# Patient Record
Sex: MALE | Race: WHITE | URBAN - METROPOLITAN AREA
[De-identification: names, ages, dates, MRNs, and addresses within clinical notes are randomized per-mention and may not be internally consistent; named-entity substitution may affect disease eponyms.]

---

## 2024-03-20 ENCOUNTER — OFFICE VISIT (OUTPATIENT)
Dept: FAMILY MEDICINE CLINIC | Facility: CLINIC | Age: 54
End: 2024-03-20
Payer: COMMERCIAL

## 2024-03-20 VITALS
WEIGHT: 219 LBS | HEART RATE: 62 BPM | TEMPERATURE: 97 F | DIASTOLIC BLOOD PRESSURE: 64 MMHG | SYSTOLIC BLOOD PRESSURE: 100 MMHG | HEIGHT: 74 IN | OXYGEN SATURATION: 98 % | BODY MASS INDEX: 28.11 KG/M2

## 2024-03-20 DIAGNOSIS — R10.31 RIGHT INGUINAL PAIN: Primary | ICD-10-CM

## 2024-03-20 DIAGNOSIS — Z12.11 SCREENING FOR COLON CANCER: ICD-10-CM

## 2024-03-20 PROBLEM — F41.9 ANXIETY: Status: ACTIVE | Noted: 2024-03-20

## 2024-03-20 PROCEDURE — 99203 OFFICE O/P NEW LOW 30 MIN: CPT | Performed by: NURSE PRACTITIONER

## 2024-03-20 RX ORDER — ESCITALOPRAM OXALATE 10 MG/1
10 TABLET ORAL DAILY
COMMUNITY

## 2024-03-20 NOTE — PROGRESS NOTES
Sam Mckinney is a 53 year old male who presents as a new patient to establish.     HPI:   Pt complains of approx one month history of abd pain, started to edgar-umbilical area then migrated to right groin area where it is still occurring intermittently, last on 3/18. Reports presents as dull ache and lasts for several hours, sometimes radiating into scrotal area as well. Is not associated with any activity or positioning. Denies fever/chills, nausea, emesis, diarrhea, bloody/dark tarry stools, BRBPR, poor appetite. Stated eating and drinking normally. Denies pain is worse with eating or having a bowel movement. Denies dysuria, hematuria, urgency, frequency. Has not treated with anything.     Has prior history of anxiety, managed with escitalopram 10mg daily that he started in December 2023 from psychiatrist. Feels this is working well, mood is stable. Denies suicidal ideations.    Works in technology consulting. Is  with 2 children. Feels he eats a healthy diet. Does exercise routinely. Reports is non-smoker. Drinks 5 alcoholic drinks per week.     Wt Readings from Last 6 Encounters:   03/20/24 219 lb (99.3 kg)   10/07/16 215 lb (97.5 kg)       No results found for: \"CHOLEST\", \"HDL\", \"LDL\", \"TRIGLY\", \"AST\", \"ALT\"   Current Outpatient Medications   Medication Sig Dispense Refill    escitalopram 10 MG Oral Tab Take 1 tablet (10 mg total) by mouth daily.        History reviewed. No pertinent past medical history.   History reviewed. No pertinent surgical history.   History reviewed. No pertinent family history.   Social History     Socioeconomic History    Marital status:    Tobacco Use    Smoking status: Never    Smokeless tobacco: Never   Substance and Sexual Activity    Alcohol use: Yes     Comment: socially    Drug use: No   Other Topics Concern    Caffeine Concern Yes     Comment: 2 cups of coffee    Exercise Yes    Seat Belt Yes      Social History: as above     Health  Maintenance  Immunizations: Recommend flu vaccine for upcoming flu season.      Immunization History   Administered Date(s) Administered    Covid-19 Vaccine Pfizer 30 mcg/0.3 ml 04/12/2021, 05/05/2021    Covid-19 Vaccine Pfizer Lonny-Sucrose 30 mcg/0.3 ml 06/14/2022    FLUZONE 6 months and older PFS 0.5 ml (37447) 11/13/2018    Flucelvax 0.5ml Vaccine 12/05/2017    Influenza 01/21/2013     Dental Visits:  Regularly  Colonoscopy:  overdue, ordered as below.     REVIEW OF SYSTEMS:   GENERAL: feels well otherwise  SKIN: denies any unusual skin lesions  HENT: denies nasal congestion, sinus pain/pressure or ear discomfort  LUNGS: denies shortness of breath with exertion  CARDIOVASCULAR: denies chest pain on exertion, palpitations  GI: as above  : as above  NEURO: denies headaches, dizziness, lightheadedness    EXAM:   /64   Pulse 62   Temp 97 °F (36.1 °C)   Ht 6' 2\" (1.88 m)   Wt 219 lb (99.3 kg)   SpO2 98%   BMI 28.12 kg/m²   Body mass index is 28.12 kg/m².   GENERAL: well developed, well nourished,in no apparent distress  SKIN: no rashes,no suspicious lesions  HEENT: atraumatic and normocephalic, EOMI  NECK: supple, normal ROM  LUNGS: clear to auscultation bilaterally. Effort non-labored.  CARDIO: RRR without murmur.   GI: soft, non-distended, no TTP w/o masses, organomegaly or hernias. Right inguinal area with mild TTP but no appreciable hernia during valsalva.   : Bilat descended testes are non-tender, w/o masses/nodules. No penile lesions. No hernias felt.  EXTREMITIES: no edema, clubbing or cyanosis. ROM to extremities WNL.  NEURO: Oriented times three, motor/sensory function intact. Follows commands appropriately.    ASSESSMENT AND PLAN:     Encounter Diagnoses   Name Primary?    Right inguinal pain- history consistent with hernia but exam negative. Consider muscle strain? Referred to Dr. Lassiter for further eval. Verbalized understanding of instructions and agreeable to this plan of care.    Yes     Screening for colon cancer- referred to Dr. Lassiter for testing.         No orders of the defined types were placed in this encounter.      Meds & Refills for this Visit:  Requested Prescriptions      No prescriptions requested or ordered in this encounter       Imaging & Consults:  SURGERY - INTERNAL    No follow-ups on file.  There are no Patient Instructions on file for this visit.

## 2024-03-28 ENCOUNTER — OFFICE VISIT (OUTPATIENT)
Facility: LOCATION | Age: 54
End: 2024-03-28
Payer: COMMERCIAL

## 2024-03-28 VITALS
HEART RATE: 50 BPM | BODY MASS INDEX: 28.11 KG/M2 | TEMPERATURE: 97 F | HEIGHT: 74 IN | OXYGEN SATURATION: 98 % | RESPIRATION RATE: 18 BRPM | WEIGHT: 219 LBS

## 2024-03-28 DIAGNOSIS — R10.31 GROIN DISCOMFORT, RIGHT: Primary | ICD-10-CM

## 2024-03-28 PROCEDURE — 99204 OFFICE O/P NEW MOD 45 MIN: CPT | Performed by: STUDENT IN AN ORGANIZED HEALTH CARE EDUCATION/TRAINING PROGRAM

## 2024-03-28 NOTE — H&P
New Patient Visit Note       Active Problems      1. Groin discomfort, right        Chief Complaint   Chief Complaint   Patient presents with    New Patient     NP - Possible right inguinal hernia, pain, pulling when moving around,   Colonoscopy consult, no family hx of colon cancer, no symptoms for colon cancer,   no other symptoms. Had a colonoscopy 10 years ago       History of Present Illness   53 year old male who is here for evaluation of right-sided abdominal and groin pain.  Patient reports pain discomfort started approximately 2 months ago.  He started a new core exercise at the time and started soon after to have pain that started at the periumbilical area.  The pain then migrated to the right lower quadrant and specifically to the right groin.  The pain is mild in severity but causes continuous discomfort.  The groin pain also radiates to the right scrotum.  He denies any symptoms in the left groin or the left testicle.  He also describes a different colicky type of pain in the right lower quadrant and radiates to the right flank.  Has had a colonoscopy 10 years ago and has not had any endoscopic evaluation since.  He denies any melena, bright red blood per rectum, unintentional weight loss nausea vomiting constipation or diarrhea.  He has been taking some fiber supplements lately and have noticed some bloating in general.  He denies any family history of colon cancer.      Allergies  Sam is allergic to seasonal.    Past Medical / Surgical / Social / Family History    The past medical and past surgical history have been reviewed by me today.    Past Medical History:   Diagnosis Date    Allergic rhinitis      Past Surgical History:   Procedure Laterality Date    COLONOSCOPY  a while ago       The family history and social history have been reviewed by me today.    History reviewed. No pertinent family history.  Social History     Socioeconomic History    Marital status:    Tobacco Use    Smoking  status: Never    Smokeless tobacco: Never   Substance and Sexual Activity    Alcohol use: Yes     Comment: socially    Drug use: No   Other Topics Concern    Caffeine Concern No    Stress Concern No    Weight Concern No    Special Diet No    Exercise No    Seat Belt No        Current Outpatient Medications:     escitalopram 10 MG Oral Tab, Take 1 tablet (10 mg total) by mouth daily., Disp: , Rfl:       Review of Systems  The Review of Systems has been reviewed by me during today.  Review of Systems   Constitutional:  Negative for chills, diaphoresis, fatigue and fever.   HENT:  Negative for ear discharge, ear pain and sore throat.    Eyes:  Negative for pain and discharge.   Respiratory:  Negative for cough, chest tightness and shortness of breath.    Cardiovascular:  Negative for chest pain, palpitations and leg swelling.   Gastrointestinal:  Positive for abdominal pain. Negative for abdominal distention, blood in stool, constipation, diarrhea, nausea and vomiting.   Genitourinary:  Negative for dysuria, frequency, hematuria and urgency.   Skin:  Negative for color change, pallor and rash.   Neurological:  Negative for weakness, light-headedness, numbness and headaches.   Hematological:  Negative for adenopathy. Does not bruise/bleed easily.   Psychiatric/Behavioral:  Negative for agitation and confusion.        Physical Findings   Pulse 50   Temp 97 °F (36.1 °C) (Temporal)   Resp 18   Ht 74\"   Wt 219 lb (99.3 kg)   SpO2 98%   BMI 28.12 kg/m²   Physical Exam  Constitutional:       Appearance: Normal appearance.   HENT:      Head: Normocephalic and atraumatic.   Cardiovascular:      Pulses: Normal pulses.   Pulmonary:      Effort: Pulmonary effort is normal.   Abdominal:      Comments: Soft nontender nondistended, right groin with questionable small inguinal hernia, tender to the touch, no rebound tenderness or guarding throughout the abdomen   Skin:     General: Skin is warm.      Capillary Refill: Capillary  refill takes less than 2 seconds.   Neurological:      Mental Status: He is alert and oriented to person, place, and time. Mental status is at baseline.             Assessment/Plan  1. Groin discomfort, right        Sam Mckinney is a 53 year old male here for evaluation of right groin pain and right lower quadrant pain.  There is a questionable small inguinal hernia on the right side.  He also has symptoms of right lower quadrant pain that is colicky in nature.  Obtain a CT scan of the abdomen pelvis to better characterize the anatomy of the right groin and see if there is a true hernia.  I will also evaluate the right colon and the right lower quadrant structures to see if there are any pathology causing his pain.  I also referred him to Dr. Ibarra for evaluation for colonoscopy to as he meets screening H criteria and has symptoms of right lower quadrant pain.     No orders of the defined types were placed in this encounter.      Imaging & Referrals   CT ABDOMEN+PELVIS(CONTRAST ONLY)(CPT=74177)    Follow Up  No follow-ups on file.    Klaudia Lassiter MD

## 2024-04-09 ENCOUNTER — LAB ENCOUNTER (OUTPATIENT)
Dept: LAB | Facility: HOSPITAL | Age: 54
End: 2024-04-09
Attending: INTERNAL MEDICINE
Payer: COMMERCIAL

## 2024-04-09 DIAGNOSIS — R10.84 GENERALIZED ABDOMINAL PAIN: ICD-10-CM

## 2024-04-09 DIAGNOSIS — R10.31 RIGHT LOWER QUADRANT PAIN: ICD-10-CM

## 2024-04-09 DIAGNOSIS — R10.13 EPIGASTRIC PAIN: ICD-10-CM

## 2024-04-09 DIAGNOSIS — R14.0 BLOATING: ICD-10-CM

## 2024-04-09 LAB
ALBUMIN SERPL-MCNC: 3.8 G/DL (ref 3.4–5)
ALBUMIN/GLOB SERPL: 1.4 {RATIO} (ref 1–2)
ALP LIVER SERPL-CCNC: 51 U/L
ALT SERPL-CCNC: 27 U/L
ANION GAP SERPL CALC-SCNC: 9 MMOL/L (ref 0–18)
AST SERPL-CCNC: 23 U/L (ref 15–37)
BASOPHILS # BLD AUTO: 0.04 X10(3) UL (ref 0–0.2)
BASOPHILS NFR BLD AUTO: 0.6 %
BILIRUB SERPL-MCNC: 0.6 MG/DL (ref 0.1–2)
BUN BLD-MCNC: 14 MG/DL (ref 9–23)
CALCIUM BLD-MCNC: 8.7 MG/DL (ref 8.5–10.1)
CHLORIDE SERPL-SCNC: 112 MMOL/L (ref 98–112)
CO2 SERPL-SCNC: 22 MMOL/L (ref 21–32)
CREAT BLD-MCNC: 1.07 MG/DL
EGFRCR SERPLBLD CKD-EPI 2021: 84 ML/MIN/1.73M2 (ref 60–?)
EOSINOPHIL # BLD AUTO: 0.21 X10(3) UL (ref 0–0.7)
EOSINOPHIL NFR BLD AUTO: 3 %
ERYTHROCYTE [DISTWIDTH] IN BLOOD BY AUTOMATED COUNT: 11.8 %
FASTING STATUS PATIENT QL REPORTED: NO
GLOBULIN PLAS-MCNC: 2.7 G/DL (ref 2.8–4.4)
GLUCOSE BLD-MCNC: 99 MG/DL (ref 70–99)
HCT VFR BLD AUTO: 45.8 %
HGB BLD-MCNC: 15.9 G/DL
IMM GRANULOCYTES # BLD AUTO: 0.01 X10(3) UL (ref 0–1)
IMM GRANULOCYTES NFR BLD: 0.1 %
LYMPHOCYTES # BLD AUTO: 2.73 X10(3) UL (ref 1–4)
LYMPHOCYTES NFR BLD AUTO: 39.3 %
MCH RBC QN AUTO: 32.4 PG (ref 26–34)
MCHC RBC AUTO-ENTMCNC: 34.7 G/DL (ref 31–37)
MCV RBC AUTO: 93.3 FL
MONOCYTES # BLD AUTO: 0.45 X10(3) UL (ref 0.1–1)
MONOCYTES NFR BLD AUTO: 6.5 %
NEUTROPHILS # BLD AUTO: 3.5 X10 (3) UL (ref 1.5–7.7)
NEUTROPHILS # BLD AUTO: 3.5 X10(3) UL (ref 1.5–7.7)
NEUTROPHILS NFR BLD AUTO: 50.5 %
OSMOLALITY SERPL CALC.SUM OF ELEC: 297 MOSM/KG (ref 275–295)
PLATELET # BLD AUTO: 231 10(3)UL (ref 150–450)
POTASSIUM SERPL-SCNC: 4 MMOL/L (ref 3.5–5.1)
PROT SERPL-MCNC: 6.5 G/DL (ref 6.4–8.2)
RBC # BLD AUTO: 4.91 X10(6)UL
SODIUM SERPL-SCNC: 143 MMOL/L (ref 136–145)
T4 FREE SERPL-MCNC: 1 NG/DL (ref 0.8–1.7)
TSI SER-ACNC: 2.16 MIU/ML (ref 0.36–3.74)
WBC # BLD AUTO: 6.9 X10(3) UL (ref 4–11)

## 2024-04-09 PROCEDURE — 84443 ASSAY THYROID STIM HORMONE: CPT

## 2024-04-09 PROCEDURE — 36415 COLL VENOUS BLD VENIPUNCTURE: CPT

## 2024-04-09 PROCEDURE — 84439 ASSAY OF FREE THYROXINE: CPT

## 2024-04-09 PROCEDURE — 80053 COMPREHEN METABOLIC PANEL: CPT

## 2024-04-09 PROCEDURE — 85025 COMPLETE CBC W/AUTO DIFF WBC: CPT

## 2024-04-26 ENCOUNTER — PATIENT MESSAGE (OUTPATIENT)
Dept: ADMINISTRATIVE | Age: 54
End: 2024-04-26

## 2024-04-26 NOTE — TELEPHONE ENCOUNTER
[facility]  online to initiate authorization    CT ABDOMEN+PELVIS(CONTRAST ONLY)(CPT=74177)        Referral #: [id number]    Scheduled For: 04/29/2024    Status: pending authorization > To discuss this case with the reviewer, contact [facility] at [phone]  .    Use Reference Case Number: [id number]      Clinical notes sent for review.     Patient notified of pending status via [employer].     Appt Desk > Noted

## 2024-05-06 ENCOUNTER — HOSPITAL ENCOUNTER (OUTPATIENT)
Dept: CT IMAGING | Facility: HOSPITAL | Age: 54
Discharge: HOME | End: 2024-05-06
Attending: STUDENT IN AN ORGANIZED HEALTH CARE EDUCATION/TRAINING PROGRAM
Payer: COMMERCIAL

## 2024-05-06 DIAGNOSIS — R10.31 GROIN DISCOMFORT, RIGHT: ICD-10-CM

## 2024-05-06 PROCEDURE — 74177 CT ABD & PELVIS W/CONTRAST: CPT | Performed by: STUDENT IN AN ORGANIZED HEALTH CARE EDUCATION/TRAINING PROGRAM

## 2024-05-07 ENCOUNTER — TELEPHONE (OUTPATIENT)
Dept: ORTHOPEDICS CLINIC | Facility: CLINIC | Age: 54
End: 2024-05-07

## 2024-05-07 DIAGNOSIS — Z01.89 ENCOUNTER FOR LOWER EXTREMITY COMPARISON IMAGING STUDY: ICD-10-CM

## 2024-05-07 DIAGNOSIS — M25.562 LEFT KNEE PAIN, UNSPECIFIED CHRONICITY: Primary | ICD-10-CM

## 2024-05-07 NOTE — TELEPHONE ENCOUNTER
Patient has an appointment scheduled with Yoni Delaney on 5/10/24 for left knee injury. Please advise if imaging is needed prior.

## 2024-05-10 ENCOUNTER — OFFICE VISIT (OUTPATIENT)
Dept: ORTHOPEDICS CLINIC | Facility: CLINIC | Age: 54
End: 2024-05-10
Payer: COMMERCIAL

## 2024-05-10 ENCOUNTER — HOSPITAL ENCOUNTER (OUTPATIENT)
Dept: GENERAL RADIOLOGY | Age: 54
Discharge: HOME | End: 2024-05-10
Attending: PHYSICIAN ASSISTANT
Payer: COMMERCIAL

## 2024-05-10 VITALS — WEIGHT: 219 LBS | BODY MASS INDEX: 28.11 KG/M2 | HEIGHT: 74 IN

## 2024-05-10 DIAGNOSIS — M25.562 LEFT KNEE PAIN, UNSPECIFIED CHRONICITY: ICD-10-CM

## 2024-05-10 DIAGNOSIS — Z01.89 ENCOUNTER FOR LOWER EXTREMITY COMPARISON IMAGING STUDY: ICD-10-CM

## 2024-05-10 DIAGNOSIS — M25.362 KNEE INSTABILITY, LEFT: Primary | ICD-10-CM

## 2024-05-10 PROCEDURE — 73564 X-RAY EXAM KNEE 4 OR MORE: CPT | Performed by: PHYSICIAN ASSISTANT

## 2024-05-10 PROCEDURE — 99204 OFFICE O/P NEW MOD 45 MIN: CPT | Performed by: PHYSICIAN ASSISTANT

## 2024-05-10 PROCEDURE — 73562 X-RAY EXAM OF KNEE 3: CPT | Performed by: PHYSICIAN ASSISTANT

## 2024-05-10 NOTE — H&P
[facility] - ORTHOPEDICS  [address] [place] [id number]  [phone]     NEW PATIENT VISIT - HISTORY AND PHYSICAL EXAMINATION     Name: Sam Mckinney   MRN: [id number]  Date: 5/10/2024     CC: Left knee pain.     REFERRED BY: None Pcp    HPI:   Sam Mckinney is a very pleasant 53 year old male who presents today for evaluation, consultation, and management of left knee injury after [employer] exercising in 2023.  He has since had persistent swelling, stiffness and instability.  He rates his pain to be a 7 out of 10 and notes less than 50% normal function.  He is otherwise very active and enjoys exercising.  He states that he had a left ACL injury in high school, and then again in college.  This never underwent extensive treatment or workup.  He complains of limited mobility, locking and limited range of motion. He works in business.      Referred from wife, and a friend at [facility].       PMH:   Past Medical History:    Allergic rhinitis    Bloating    see above    Chronic cough    cough lasted months.  i was given a precription of prednisone in March to get rid of cough.    Flatulence/gas pain/belching    started around 4 days after finishing a 5 day prednisone prescription.    Stress    i have a stressful job.  i was given a prescription of generic lexapro that i started in December.    Wears glasses    i have had flasses si ce i was 17.  i had lasik on my 30s.       PAST SURGICAL HX:  Past Surgical History:   Procedure Laterality Date    Colonoscopy  a while ago       FAMILY HX:  Family History   Problem Relation Age of Onset    Heart Attack Mother          of heart attack    Stroke Paternal Grandfather        ALLERGIES:  Seasonal    MEDICATIONS:   Current Outpatient Medications   Medication Sig Dispense Refill    PEG 3350-KCl-NaBcb-NaCl-NaSulf (PEG-3350/ELECTROLYTES) 236 g Oral Recon Soln Use as directed by physician. 4000 mL 0       ROS: A  comprehensive 14 point review of systems was performed and was negative aside from the aforementioned per history of present illness.    SOCIAL HX:  Social History     Occupational History    Not on file   Tobacco Use    Smoking status: Never    Smokeless tobacco: Never   Substance and Sexual Activity    Alcohol use: Yes     Alcohol/week: 4.0 standard drinks of alcohol     Types: 4 Standard drinks or equivalent per week     Comment: i have a couple of drinks 2 -3 days a week on average    Drug use: No    Sexual activity: Not on file       PE:   Vitals:    05/10/24 0755   Weight: 219 lb (99.3 kg)   Height: 6' 2\" (1.88 m)     Estimated body mass index is 28.12 kg/m² as calculated from the following:    Height as of this encounter: 6' 2\" (1.88 m).    Weight as of this encounter: 219 lb (99.3 kg).    Physical Exam  Constitutional:       Appearance: Normal appearance.   HENT:      Head: Normocephalic and atraumatic.   Eyes:      Extraocular Movements: Extraocular movements intact.   Neck:      Musculoskeletal: Normal range of motion and neck supple.   Cardiovascular:      Pulses: Normal pulses.   Pulmonary:      Effort: Pulmonary effort is normal. No respiratory distress.   Abdominal:      General: There is no distension.   Skin:     General: Skin is warm.      Capillary Refill: Capillary refill takes less than 2 seconds.      Findings: No bruising.   Neurological:      General: No focal deficit present.      Mental Status: Alert.   Psychiatric:         Mood and Affect: Mood normal.     Examination of the left knee demonstrates:     Skin is intact, warm and dry.   Atrophy: none    Effusion: moderate    Joint line tenderness: none  Crepitation: none   April:  positive    Patellar mobility: normal without apprehension  J-sign: none    ROM: Extension full  Flexion 120 degrees  ACL:  2B   PCL:  Negative Posterior Drawer  Collateral Ligaments: Stable to Varus and Valgus stress at 0 and 30 degrees  Strength: mild weakness    Hip joint: normal pain-free ROM   Gait:  mildly antalgic   Leg length: equal and symmetric  Alignment:  neutral     No obvious peripheral edema noted.   Distal neurovascular exam demonstrates normal perfusion, intact sensation to light touch and full strength.     Examination of the contralateral knee demonstrates:  No significant atrophy, swelling or effusion. Full range of motion. Neurovascularly intact distally.    Radiographic Examination/Diagnostics:  I personally viewed, independently interpreted and radiology report was reviewed.      CT ABDOMEN+PELVIS(CONTRAST ONLY)(CPT=74177)    Result Date: 5/6/2024  PROCEDURE:  CT ABDOMEN+PELVIS (CONTRAST ONLY) (CPT=74177)  COMPARISON:  None.  INDICATIONS:  R10.31 Groin discomfort, right  TECHNIQUE:  CT scanning was performed from the dome of the diaphragm to the pubic symphysis with non-ionic intravenous contrast material. Post contrast coronal MPR imaging was performed.  Dose reduction techniques were used. Dose information is transmitted to the ACR (American College of Radiology) NRDR (National Radiology Data Registry) which includes the Dose Index Registry.  PATIENT STATED HISTORY:(As transcribed by Technologist)  Patient c/o right groin discomfort, and abdominal pain.   CONTRAST USED:  100cc of Isovue 370  FINDINGS:  LIVER:  There are 2 small hypoattenuating lesions within the left lobe of the liver measuring 4 mm and 3 mm.  Both of the lesions are too small to definitively characterize.  In the absence of any known malignancy or risk factors for hepatoma these are considered benign.  BILIARY:  No visible dilatation or calcification.  PANCREAS:  No lesion, fluid collection, ductal dilatation, or atrophy.  SPLEEN:  No enlargement or focal lesion.  There is a small accessory splenule. KIDNEYS:  No mass, obstruction, or calcification.  ADRENALS:  No mass or enlargement.  AORTA/VASCULAR:  No aneurysm or dissection.  RETROPERITONEUM:  No mass or adenopathy.   BOWEL/MESENTERY:  No visible mass, obstruction, or bowel wall thickening.  There is a normal-appearing appendix.  ABDOMINAL WALL:  There is a tiny fat containing umbilical hernia without bowel herniation.  There is a fat containing left inguinal hernia without bowel herniation.  No definitive right inguinal hernia identified. URINARY BLADDER:  The bladder is not distended limiting evaluation for potential wall thickening. PELVIC NODES:  No adenopathy.  PELVIC ORGANS:  No visible mass.  Pelvic organs appropriate for patient age.  BONES:  Degenerative disc disease noted at L5-S1 with disc space narrowing endplate spurring.  Small central disc protrusion noted at L4-5. LUNG BASES:  Mild dependent atelectasis is seen in both lower lobes. OTHER:  Negative.             CONCLUSION:  1. There is a small fat containing left inguinal hernia without bowel herniation.  There is a tiny fat containing umbilical hernia.  There is no definitive right inguinal hernia identified. 2. Degenerative disc disease at L5-S1 noted with disc space narrowing endplate spurring.  There is a small central disc protrusion expected at L4-5.  Correlate clinically.  The need for follow-up MR imaging should be based clinically. 3. Other incidental findings noted as detailed above.    LOCATION:  [facility]   Dictated by (CST): Salvador Livingston MD on 5/06/2024 at 6:32 PM     Finalized by (CST): Salvador Livingston MD on 5/06/2024 at 6:46 PM         IMPRESSION: Sam Mckinney is a 53 year old male who presents with left knee injury concerning for ACL tear.     PLAN:   We had a detailed discussion outlining the etiology, anatomy, pathophysiology, and natural history of the patient's findings. Imaging was reviewed in detail and correlated to a 3-dimensional model of the patient's pathology.     We reviewed the treatment of this disease condition.  In light of the acute traumatic incident, loss of normal function, and  failure to progress  conservatively we recommend an MRI to evaluate the integrity of the patient's left knee ACL. The patient will follow up after imaging.   Differential diagnosis includes but not limited to: cartilage injury/loose body, meniscus tear/injury, ACL tear, bone marrow edema, and osteoarthritis.     External records were also reviewed for pertinent historical findings contributing to the patients undiagnosed new problem with uncertain prognosis.     The patient had the opportunity to ask questions, and all questions were answered appropriately.     FOLLOW-UP:  Return to clinic following completion of MRI to review scan and findings.             Yoni Delaney [facility], PA-C Orthopedic Surgery / Sports Medicine Specialist  [facility] Surgery  [address] [place] [address] [id number] [place], [email or web address]  [email or web address]  t: [phone]  o: [phone]  f: [phone]    This note was dictated using Dragon software.  While it was briefly proofread prior to completion, some grammatical, spelling, and word choice errors due to dictation may still occur.

## 2024-05-12 ENCOUNTER — HOSPITAL ENCOUNTER (OUTPATIENT)
Dept: MRI IMAGING | Facility: HOSPITAL | Age: 54
End: 2024-05-12
Attending: PHYSICIAN ASSISTANT

## 2024-05-12 ENCOUNTER — HOSPITAL ENCOUNTER (OUTPATIENT)
Dept: MRI IMAGING | Facility: HOSPITAL | Age: 54
Discharge: HOME | End: 2024-05-12
Attending: PHYSICIAN ASSISTANT

## 2024-05-12 DIAGNOSIS — M25.362 KNEE INSTABILITY, LEFT: ICD-10-CM

## 2024-05-12 PROCEDURE — 73721 MRI JNT OF LWR EXTRE W/O DYE: CPT | Performed by: PHYSICIAN ASSISTANT

## 2024-05-15 PROBLEM — R19.7 DIARRHEA: Status: ACTIVE | Noted: 2024-05-15

## 2024-05-15 PROBLEM — R10.13 ABDOMINAL PAIN, EPIGASTRIC: Status: ACTIVE | Noted: 2024-05-15

## 2024-05-15 PROBLEM — D12.0 BENIGN NEOPLASM OF CECUM: Status: ACTIVE | Noted: 2024-05-15

## 2024-05-15 PROBLEM — K29.30 CHRONIC SUPERFICIAL GASTRITIS WITHOUT BLEEDING: Status: ACTIVE | Noted: 2024-05-15

## 2024-05-15 PROBLEM — Z12.11 SPECIAL SCREENING FOR MALIGNANT NEOPLASMS, COLON: Status: ACTIVE | Noted: 2024-05-15

## 2024-05-15 PROBLEM — R14.1 ABDOMINAL GAS PAIN: Status: ACTIVE | Noted: 2024-05-15

## 2024-05-17 ENCOUNTER — OFFICE VISIT (OUTPATIENT)
Dept: ORTHOPEDICS CLINIC | Facility: CLINIC | Age: 54
End: 2024-05-17

## 2024-05-17 DIAGNOSIS — M94.20 CHONDROMALACIA: ICD-10-CM

## 2024-05-17 DIAGNOSIS — M25.362 KNEE INSTABILITY, LEFT: Primary | ICD-10-CM

## 2024-05-17 PROCEDURE — 99213 OFFICE O/P EST LOW 20 MIN: CPT | Performed by: PHYSICIAN ASSISTANT

## 2024-05-17 NOTE — PROGRESS NOTES
[facility] - ORTHOPEDICS  [address] [place] [address]  [phone]       Name: Sam Mckinney   MRN: [id number]  Date: 5/17/2024     REASON FOR VISIT: Follow up for left knee pain.     INTERVAL HISTORY:  Sam Mckinney is a 53 year old male who returns for evaluation of left knee pain.     To summarize,  left knee injury after [employer] exercising in January 2023.  He has since had persistent swelling, stiffness and instability.  He rates his pain to be a 7 out of 10 and notes less than 50% normal function.  He is otherwise very active and enjoys exercising.  He states that he had a left ACL injury in high school, and then again in college.  This never underwent extensive treatment or workup.  He complains of limited mobility, locking and limited range of motion. He works in business.       Referred from wife, and a friend at [facility].     ROS: ROS    PE:   There were no vitals filed for this visit.  Estimated body mass index is 28.12 kg/m² as calculated from the following:    Height as of 5/10/24: 6' 2\" (1.88 m).    Weight as of 5/10/24: 219 lb (99.3 kg).    Physical Exam  Constitutional:       Appearance: Normal appearance.   HENT:      Head: Normocephalic and atraumatic.   Eyes:      Extraocular Movements: Extraocular movements intact.   Neck:      Musculoskeletal: Normal range of motion and neck supple.   Cardiovascular:      Pulses: Normal pulses.   Pulmonary:      Effort: Pulmonary effort is normal. No respiratory distress.   Abdominal:      General: There is no distension.   Skin:     General: Skin is warm.      Capillary Refill: Capillary refill takes less than 2 seconds.      Findings: No bruising.   Neurological:      General: No focal deficit present.      Mental Status: She is alert.   Psychiatric:         Mood and Affect: Mood normal.     Examination of the left knee demonstrates:      Skin is intact, warm and dry.   Atrophy: none    Effusion:  moderate    Joint line tenderness: none  Crepitation: none   April:  positive    Patellar mobility: normal without apprehension  J-sign: none    ROM: Extension full  Flexion 120 degrees  ACL:  2B   PCL:  Negative Posterior Drawer  Collateral Ligaments: Stable to Varus and Valgus stress at 0 and 30 degrees  Strength: mild weakness   Hip joint: normal pain-free ROM   Gait:  mildly antalgic   Leg length: equal and symmetric  Alignment:  neutral      No obvious peripheral edema noted.   Distal neurovascular exam demonstrates normal perfusion, intact sensation to light touch and full strength.      Examination of the contralateral knee demonstrates:  No significant atrophy, swelling or effusion. Full range of motion. Neurovascularly intact distally.    Radiographic Examination/Diagnostics:    I personally viewed, independently interpreted and radiology report was reviewed.    MRI KNEE, LEFT (GJY=28229)    Result Date: 5/13/2024  PROCEDURE:  MRI KNEE, LEFT (CPT=73721)  COMPARISON:  None.  INDICATIONS:  Patient presents with left knee pain and instability.  TECHNIQUE:  Axial, coronal, and sagittal proton density with and without fat saturation images were obtained.  PATIENT STATED HISTORY: (As transcribed by Technologist)  Pt has left knee pain.    FINDINGS:  LIGAMENTS:          The ACL, PCL, patellar retinacula, and collateral ligament complexes are intact.  There is thickening of the proximal, superficial aspect of the MCL consistent with low-grade chronic sprain. MENISCI:            The medial and lateral menisci are intact without evidence of tear or significant degenerative change. TENDONS:            There is mild distal quadriceps tendinosis and mild patellar tendinosis.  No high-grade tendinopathy about the knee noted. MUSCULATURE:        No evidence of strain, edema, or atrophy. BONY COMPARTMENTS:  There is mild osteoarthritis involving the patellofemoral and medial femoral tibial compartments with mild,  low-grade articular cartilage loss involving the medial patellar facet, mid trochlear cartilage and mid weight-bearing surface  of the medial femoral condyle.  Mild chondromalacia of the lateral femoral tibial compartment without raysa osteoarthritis. SYNOVIUM:           There is a moderate knee joint effusion.  No intra-articular bodies are suggested.             CONCLUSION:  1. Mild osteoarthritis of the patellofemoral and medial femoral tibial compartments. 2. Mild chondromalacia of the lateral femoral tibial compartment. 3. Moderate joint effusion.   LOCATION:  [facility]          Dictated by (CST): Juancarlos Solo DO on 5/13/2024 at 8:27 AM     Finalized by (CST): Juancarlos Solo DO on 5/13/2024 at 8:43 AM       XR KNEE (3 VIEWS), RIGHT (CPT=73562)    Result Date: 5/10/2024  PROCEDURE:  XR KNEE ROUTINE (3 VIEWS), RIGHT (CPT=73562)  TECHNIQUE:  Three views were obtained including patellar view.  COMPARISON:  None.  INDICATIONS:  Left knee pain  PATIENT STATED HISTORY: (As transcribed by Technologist)  Ortho consult. Patient states left knee pain for 2 months, right knee imaged for comparison.     FINDINGS:  No acute fracture or dislocation.  There is tricompartmental osteoarthritis with joint space narrowing and osteophyte formation, greatest in the medial compartment.  There is chondrocalcinosis.            CONCLUSION:  No acute osseous findings.  Moderate osteoarthritis.   LOCATION:  [facility]   Dictated by (CST): Kwame Guan MD on 5/10/2024 at 8:25 AM     Finalized by (CST): Kwame Guan MD on 5/10/2024 at 8:26 AM       XR KNEE, COMPLETE (4 OR MORE VIEWS), LEFT (CPT=73564)    Result Date: 5/10/2024  PROCEDURE:  XR KNEE, COMPLETE (4 OR MORE VIEWS), LEFT (CPT=73564)  TECHNIQUE:  AP, lateral, sunrise, and tunnel views were obtained  COMPARISON:  None.  INDICATIONS:  M25.562 Left knee pain, unspecified chronicity  PATIENT STATED HISTORY: (As transcribed by Technologist)  Ortho consult. Patient states left knee pain for  2 months, right knee imaged for comparison.    FINDINGS:  No acute fracture or dislocation.  There is tricompartmental osteoarthritis with joint space narrowing and osteophyte formation, greatest in the medial compartment, overall moderate in degree.  There is chondrocalcinosis.  No significant joint effusion.            CONCLUSION:  No acute osseous findings.  Moderate tricompartmental osteoarthritis.  Chondrocalcinosis.   LOCATION:  [facility]   Dictated by (CST): Kwame Guan MD on 5/10/2024 at 8:18 AM     Finalized by (CST): Kwame Guan MD on 5/10/2024 at 8:19 AM       CT ABDOMEN+PELVIS(CONTRAST ONLY)(CPT=74177)    Result Date: 5/6/2024  PROCEDURE:  CT ABDOMEN+PELVIS (CONTRAST ONLY) (CPT=74177)  COMPARISON:  None.  INDICATIONS:  R10.31 Groin discomfort, right  TECHNIQUE:  CT scanning was performed from the dome of the diaphragm to the pubic symphysis with non-ionic intravenous contrast material. Post contrast coronal MPR imaging was performed.  Dose reduction techniques were used. Dose information is transmitted to the ACR (American College of Radiology) NRDR (National Radiology Data Registry) which includes the Dose Index Registry.  PATIENT STATED HISTORY:(As transcribed by Technologist)  Patient c/o right groin discomfort, and abdominal pain.   CONTRAST USED:  100cc of Isovue 370  FINDINGS:  LIVER:  There are 2 small hypoattenuating lesions within the left lobe of the liver measuring 4 mm and 3 mm.  Both of the lesions are too small to definitively characterize.  In the absence of any known malignancy or risk factors for hepatoma these are considered benign.  BILIARY:  No visible dilatation or calcification.  PANCREAS:  No lesion, fluid collection, ductal dilatation, or atrophy.  SPLEEN:  No enlargement or focal lesion.  There is a small accessory splenule. KIDNEYS:  No mass, obstruction, or calcification.  ADRENALS:  No mass or enlargement.  AORTA/VASCULAR:  No aneurysm or dissection.  RETROPERITONEUM:  No  mass or adenopathy.  BOWEL/MESENTERY:  No visible mass, obstruction, or bowel wall thickening.  There is a normal-appearing appendix.  ABDOMINAL WALL:  There is a tiny fat containing umbilical hernia without bowel herniation.  There is a fat containing left inguinal hernia without bowel herniation.  No definitive right inguinal hernia identified. URINARY BLADDER:  The bladder is not distended limiting evaluation for potential wall thickening. PELVIC NODES:  No adenopathy.  PELVIC ORGANS:  No visible mass.  Pelvic organs appropriate for patient age.  BONES:  Degenerative disc disease noted at L5-S1 with disc space narrowing endplate spurring.  Small central disc protrusion noted at L4-5. LUNG BASES:  Mild dependent atelectasis is seen in both lower lobes. OTHER:  Negative.             CONCLUSION:  1. There is a small fat containing left inguinal hernia without bowel herniation.  There is a tiny fat containing umbilical hernia.  There is no definitive right inguinal hernia identified. 2. Degenerative disc disease at L5-S1 noted with disc space narrowing endplate spurring.  There is a small central disc protrusion expected at L4-5.  Correlate clinically.  The need for follow-up MR imaging should be based clinically. 3. Other incidental findings noted as detailed above.    LOCATION:  [facility]   Dictated by (CST): Salvador Livingston MD on 5/06/2024 at 6:32 PM     Finalized by (CST): Salvador Livingston MD on 5/06/2024 at 6:46 PM         IMPRESSION: Sam Mckinney is a 53 year old male who presented for follow up of left knee MCL sprain, and chondromalacia.     PLAN:   We had a detailed discussion outlining the etiology, anatomy, pathophysiology, and natural history of the patient's findings.    We reviewed the treatment of this disease condition.  Fortunately, treatment is amenable to conservative treatment which we chose to optimize at today's visit.  We recommended physical therapy to aid in strengthening, range  of motion, functional improvement, and return to baseline activity.      We reviewed the treatment of this disease condition.     We provided education, and discussed at great length the use of OrthoBiologics, specifically, Platelet Rich Plasma (PRP). We discussed the growing evidence for the efficacy of PRP injections with regard to the patient's specific findings, as well as the promotion of healing for muscle, tendon, and joint injuries.     We discussed the scientific rationale for this procedure which is that the plasma contains platelets which release growth factors that induce a healing response wherever they are applied. We also discussed the benefits, risks, and limitations. The patient understands that there is a slightly higher level of complexity to this procedure compared to other injections such as cortisone, or viscosupplementation.     We also discussed the benefits of PRP in comparison to surgery, specifically including, but not limited to: less invasive than an open surgical procedure for the same condition, possible shorter recovery time, significantly more cost effective.     The patient had opportunity to ask questions and all questions were answered appropriately.    I spent 20 minutes in preparation to see the patient, counseling/education of relevant pathology, discussing imaging results, ordering therapy  intervention, care coordination, and documentation into the electronic medical record.  The patient had opportunity to ask questions and all questions were answered appropriately.    FOLLOW-UP:  As needed, left knee PRP.             Sincer ENIO Delaney, [facility], PA-C Orthopedic Surgery / Sports Medicine Specialist  [facility]  [address] [place] [id number]   [email or web address]  [email or web address]  t: [phone]  o: [phone]  f: [phone]    This note was dictated using Dragon software.  While it was briefly proofread prior to completion, some grammatical,  spelling, and word choice errors due to dictation may still occur.

## 2024-08-07 ENCOUNTER — NURSE ONLY (OUTPATIENT)
Dept: LAB | Facility: HOSPITAL | Age: 54
End: 2024-08-07
Attending: INTERNAL MEDICINE
Payer: COMMERCIAL

## 2024-08-07 DIAGNOSIS — Z86.19 HISTORY OF HELICOBACTER PYLORI INFECTION: ICD-10-CM

## 2024-08-07 PROCEDURE — 83013 H PYLORI (C-13) BREATH: CPT

## 2024-08-08 LAB — H PYLORI BREATH TEST: NEGATIVE

## 2024-09-13 ENCOUNTER — HOSPITAL ENCOUNTER (OUTPATIENT)
Dept: MRI IMAGING | Age: 54
Discharge: HOME | End: 2024-09-13
Attending: INTERNAL MEDICINE
Payer: COMMERCIAL

## 2024-09-13 DIAGNOSIS — K76.9 LIVER LESION: ICD-10-CM

## 2024-09-13 PROCEDURE — A9581 GADOXETATE DISODIUM INJ: HCPCS | Performed by: INTERNAL MEDICINE

## 2024-09-13 PROCEDURE — 74183 MRI ABD W/O CNTR FLWD CNTR: CPT | Performed by: INTERNAL MEDICINE

## 2024-12-11 ENCOUNTER — TELEPHONE (OUTPATIENT)
Dept: ORTHOPEDICS CLINIC | Facility: CLINIC | Age: 54
End: 2024-12-11

## 2024-12-11 DIAGNOSIS — M54.50 LOW BACK PAIN, UNSPECIFIED BACK PAIN LATERALITY, UNSPECIFIED CHRONICITY, UNSPECIFIED WHETHER SCIATICA PRESENT: Primary | ICD-10-CM

## 2024-12-11 NOTE — TELEPHONE ENCOUNTER
Xray ordered, scheduled and patient is aware to come in early.      Future Appointments   Date Time Provider Department Center   12/19/2024  1:20 PM Ankur King,  [facility]

## 2024-12-11 NOTE — TELEPHONE ENCOUNTER
NP, referred by his physical therapist for chronic lower back pain.    Advised patient to arrive 20 min early for poss imaging.    Please advise, thanks.

## 2024-12-31 ENCOUNTER — TELEPHONE (OUTPATIENT)
Dept: ORTHOPEDICS CLINIC | Facility: CLINIC | Age: 54
End: 2024-12-31

## 2024-12-31 NOTE — TELEPHONE ENCOUNTER
Patient was asked to reschedule appointment due to arriving after the river period. After patient left the facility I called patient and left a voicemail to have him take his xray when he can before the next appointment.

## 2025-06-24 ENCOUNTER — OFFICE VISIT (OUTPATIENT)
Dept: ORTHOPEDICS CLINIC | Facility: CLINIC | Age: 55
End: 2025-06-24
Payer: COMMERCIAL

## 2025-06-24 ENCOUNTER — HOSPITAL ENCOUNTER (OUTPATIENT)
Dept: GENERAL RADIOLOGY | Age: 55
Discharge: HOME | End: 2025-06-24
Attending: PHYSICIAN ASSISTANT
Payer: COMMERCIAL

## 2025-06-24 ENCOUNTER — TELEPHONE (OUTPATIENT)
Dept: ORTHOPEDICS CLINIC | Facility: CLINIC | Age: 55
End: 2025-06-24

## 2025-06-24 VITALS — WEIGHT: 215 LBS | BODY MASS INDEX: 27.59 KG/M2 | HEIGHT: 74 IN

## 2025-06-24 DIAGNOSIS — Z01.89 ENCOUNTER FOR LOWER EXTREMITY COMPARISON IMAGING STUDY: ICD-10-CM

## 2025-06-24 DIAGNOSIS — M65.961 SYNOVITIS OF RIGHT KNEE: ICD-10-CM

## 2025-06-24 DIAGNOSIS — Z01.89 ENCOUNTER FOR LOWER EXTREMITY COMPARISON IMAGING STUDY: Primary | ICD-10-CM

## 2025-06-24 DIAGNOSIS — M25.861 IMPINGEMENT OF RIGHT KNEE JOINT: ICD-10-CM

## 2025-06-24 DIAGNOSIS — M25.561 RIGHT KNEE PAIN, UNSPECIFIED CHRONICITY: ICD-10-CM

## 2025-06-24 DIAGNOSIS — M23.41 LOOSE BODY IN KNEE, RIGHT KNEE: Primary | ICD-10-CM

## 2025-06-24 PROCEDURE — 99214 OFFICE O/P EST MOD 30 MIN: CPT | Performed by: PHYSICIAN ASSISTANT

## 2025-06-24 PROCEDURE — 73562 X-RAY EXAM OF KNEE 3: CPT | Performed by: PHYSICIAN ASSISTANT

## 2025-06-24 PROCEDURE — 73564 X-RAY EXAM KNEE 4 OR MORE: CPT | Performed by: PHYSICIAN ASSISTANT

## 2025-08-05 ENCOUNTER — TELEPHONE (OUTPATIENT)
Dept: FAMILY MEDICINE CLINIC | Facility: CLINIC | Age: 55
End: 2025-08-05